# Patient Record
(demographics unavailable — no encounter records)

---

## 2024-12-12 NOTE — DATA REVIEWED
[de-identified] : X-rays of his left elbow in the cast including 3 views show what seems to be a type I supracondylar fracture of the distal humerus with good periosteal healing.

## 2024-12-12 NOTE — PHYSICAL EXAM
[FreeTextEntry1] : Lorraine is an alert, comfortable, well-developed, in no distress, 4-1/2 -year-old girl who is right-hand dominant. She has a well fitting and intact left long-arm cast which is removed.  She is apprehensive to move her left elbow due to stiffness.  Her skin is intact. There are no clinical deformities. No tenderness to palpation. Neurovascularly grossly intact.

## 2024-12-12 NOTE — ASSESSMENT
[FreeTextEntry1] : Diagnosis: Well-healing type I supracondylar fracture left elbow.  The history was obtained today from the child and parent; given the patient's age and/or the child's mental capacity, the history was unreliable and the parent was used as an independent historian.  Healthy 4-1/2-year-old girl 5-1/2 weeks out of the above fracture.  Cast is discontinued.  Mother is informed as to what to expect within the next few days regarding the stiffness and weakness.  No playground activities for 7 days.  Follow-up on a as needed basis.  All of the mother's questions were addressed. She understood and agreed with the plan.  The office visit is conducted in [place], the family's native language.  This note was generated using Dragon medical dictation software.  A reasonable effort has been made for proofreading its contents, but typos may still remain.  If there are any questions or points of clarification needed please do not hesitate to contact my office.

## 2024-12-12 NOTE — DEVELOPMENTAL MILESTONES
[Normal] : Developmental history within normal limits [Sit Up: ___ Months] : Sit Up: [unfilled] months [Pull Self to Stand ___ Months] : Pull self to stand: [unfilled] months [Walk ___ Months] : Walk: [unfilled] months [Verbally] : verbally [Right] : right [FreeTextEntry2] : No [FreeTextEntry3] : Left LAC

## 2024-12-12 NOTE — HISTORY OF PRESENT ILLNESS
[FreeTextEntry1] : Lorraine is an otherwise healthy and active 4-1/2-year-old girl brought in by her mother after being sent by her pediatrician for an orthopedic evaluation of the left arm injury sustained on November 5 when she was playing with her cousin who fell on top of her arm.  She was taken to an outside facility and placed in a cast on the next day that is November 16.  Initially the patient was treated somewhere else but they no longer take their insurance.  She has been doing well.

## 2024-12-12 NOTE — CONSULT LETTER
[Dear  ___] : Dear  [unfilled], [Consult Letter:] : I had the pleasure of evaluating your patient, [unfilled]. [Please see my note below.] : Please see my note below. [Consult Closing:] : Thank you very much for allowing me to participate in the care of this patient.  If you have any questions, please do not hesitate to contact me. [Sincerely,] : Sincerely, [FreeTextEntry3] :  Jayro King MD [facility] Pediatric Orthopedic Surgery [address] [place] [address] Phone: [phone] / Fax: [phone]